# Patient Record
Sex: MALE | Race: OTHER
[De-identification: names, ages, dates, MRNs, and addresses within clinical notes are randomized per-mention and may not be internally consistent; named-entity substitution may affect disease eponyms.]

---

## 2018-10-10 ENCOUNTER — HOSPITAL ENCOUNTER (EMERGENCY)
Dept: HOSPITAL 62 - ER | Age: 33
LOS: 1 days | Discharge: HOME | End: 2018-10-11
Payer: SELF-PAY
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
## 2018-10-10 DIAGNOSIS — S68.129A: Primary | ICD-10-CM

## 2018-10-10 PROCEDURE — 99283 EMERGENCY DEPT VISIT LOW MDM: CPT

## 2018-10-10 PROCEDURE — 96365 THER/PROPH/DIAG IV INF INIT: CPT

## 2018-10-10 PROCEDURE — 96375 TX/PRO/DX INJ NEW DRUG ADDON: CPT

## 2018-10-10 PROCEDURE — 73140 X-RAY EXAM OF FINGER(S): CPT

## 2018-10-11 VITALS — DIASTOLIC BLOOD PRESSURE: 99 MMHG | SYSTOLIC BLOOD PRESSURE: 141 MMHG

## 2018-10-11 NOTE — ER DOCUMENT REPORT
ED General





- General


Chief Complaint: Finger Injury


Stated Complaint: FINGERTIP LACERATION


Time Seen by Provider: 10/10/18 23:47


Notes: 





Patient is a 33-year-old male presenting to the emergency department after 

right middle finger injury.  Patient states around 2300 hrs. this evening he 

was helping a friend move furniture when he got his right middle finger caught 

underneath a moving victoria.  States he pulled his finger out from under them 

moving ivctoria and noticed the tip of the finger was gone. Stated he collected 

the tip and brought it to the ED. 





Past medical history: None


Medications: None


Allergies: None


Patient denies any history of bleeding disorders.  Last oral intake 2200 states 

he is also had 2 beers since 2100.


TRAVEL OUTSIDE OF THE [place] IN LAST 30 DAYS: No





- Related Data


Allergies/Adverse Reactions: 


 





No Known Allergies Allergy (Unverified 10/10/18 23:55)


 











Past Medical History





- General


Information source: Patient





- Social History


Smoking Status: Unknown if Ever Smoked


Lives with: Family


Family History: Reviewed & Not Pertinent


Patient has suicidal ideation: No


Patient has homicidal ideation: No


Renal/ Medical History: Denies: Hx Peritoneal Dialysis





Review of Systems





- Review of Systems


Constitutional: No symptoms reported


EENT: No symptoms reported


Cardiovascular: No symptoms reported


Respiratory: No symptoms reported


Gastrointestinal: No symptoms reported


Genitourinary: No symptoms reported


Male Genitourinary: No symptoms reported


Musculoskeletal: See HPI


Skin: See HPI


Hematologic/Lymphatic: See HPI


Neurological/Psychological: No symptoms reported





Physical Exam





- Vital signs


Vitals: 


 











Temp Pulse Resp BP Pulse Ox


 


 98.3 F   101 H  18   149/111 H  96 


 


 10/10/18 23:29  10/10/18 23:29  10/10/18 23:29  10/10/18 23:29  10/10/18 23:29














- Notes


Notes: 





GENERAL: Alert, interacts well. No acute distress.


HEAD: Normocephalic, atraumatic.


EYES: Pupils equal, round, and reactive to light. Extraocular movements intact.


ENT: Oral mucosa moist, tongue midline. 


NECK: Full range of motion. Supple. Trachea midline.


LUNGS: Clear to auscultation bilaterally, no wheezes, rales, or rhonchi. No 

respiratory distress.


HEART: Regular rate and rhythm. No murmur


ABDOMEN: Soft, non-tender. Non-distended. Bowel sounds present in all 4 

quadrants.


EXTREMITIES: Moves all 4 extremities spontaneously. normal radial and dorsalis 

pedis pulses bilaterally. 


BACK: no cervical, thoracic, lumbar midline tenderness. No saddle anesthesia, 

normal distal neurovascular exam. 


NEUROLOGICAL: Alert and oriented x3. Normal speech. 


PSYCH: Normal affect, normal mood.


SKIN: Warm, dry, normal turgor. Middle finger right hand avulsion of tip distal 

to DIP joint. Clean cut, no skin flap.








Course





- Re-evaluation


Re-evalutation: 


Discussed case with Dr. Sellers requests dry dressing applied to distal finger.  

Antibiotic preferences Augmentin.  Follow-up in his office tomorrow morning at 

8 AM.  All of these closely discussed with patient.











- Vital Signs


Vital signs: 


 











Temp Pulse Resp BP Pulse Ox


 


 97.4 F   68   16   141/99 H  99 


 


 10/11/18 02:50  10/11/18 02:50  10/11/18 02:50  10/11/18 02:50  10/11/18 02:50














Discharge





- Discharge


Clinical Impression: 


Fingertip amputation


Qualifiers:


 Encounter type: initial encounter Qualified Code(s): S68.129A - Partial 

traumatic metacarpophalangeal amputation of unspecified finger, initial 

encounter





Condition: Stable


Disposition: HOME, SELF-CARE


Additional Instructions: 


As we discussed you are seen here in the emergency room for a partial finger 

amputation.  Please keep the dressings we have applied in place until you follow

-up with Dr. Sellers tomorrow morning at 8 AM.  His contact information is 

within this paperwork.  As we discussed he expects either tomorrow at 8 AM.  

Please take antibiotics as prescribed.  Return for any concerning symptoms.


Prescriptions: 


Amox Tr/Potassium Clavulanate [Augmentin 875-125 mg Tablet] 1 tab PO BID #[id number] 

tablet


Referrals: 


MARIA ANTONIA SELLERS MD [ACTIVE STAFF] - Follow up as needed

## 2018-10-11 NOTE — RADIOLOGY REPORT (SQ)
Right third finger three view on 10/11/2018 at 12:26 AM



CLINICAL INDICATION: Crush injury, avulsion of the third distal

finger



COMPARISON: None



FINDINGS: There has been amputation of the distal aspect of the

third finger. There is an associated open fracture of the distal

tuft of the third distal phalanx. The missing fracture fragment

and soft tissue is not present. No other fracture is noted.

Visualized joints are well aligned.



IMPRESSION: Open fracture/amputation of the distal tuft of the

third distal phalanx.

## 2018-10-15 ENCOUNTER — HOSPITAL ENCOUNTER (OUTPATIENT)
Dept: HOSPITAL 62 - OROUT | Age: 33
Discharge: HOME | End: 2018-10-15
Attending: ORTHOPAEDIC SURGERY
Payer: SELF-PAY

## 2018-10-15 VITALS — DIASTOLIC BLOOD PRESSURE: 98 MMHG | SYSTOLIC BLOOD PRESSURE: 143 MMHG

## 2018-10-15 DIAGNOSIS — S68.612A: Primary | ICD-10-CM

## 2018-10-15 DIAGNOSIS — F17.210: ICD-10-CM

## 2018-10-15 DIAGNOSIS — M86.141: ICD-10-CM
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
## 2018-10-15 LAB
ANION GAP SERPL CALC-SCNC: 8 MMOL/L (ref 5–19)
APPEARANCE UR: CLEAR
APTT PPP: (no result) S
BILIRUB UR QL STRIP: NEGATIVE
BUN SERPL-MCNC: 11 MG/DL (ref 7–20)
CALCIUM: 9.2 MG/DL (ref 8.4–10.2)
CHLORIDE SERPL-SCNC: 107 MMOL/L (ref 98–107)
CO2 SERPL-SCNC: 24 MMOL/L (ref 22–30)
ERYTHROCYTE [DISTWIDTH] IN BLOOD BY AUTOMATED COUNT: 13.7 % (ref 11.5–14)
GLUCOSE SERPL-MCNC: 105 MG/DL (ref 75–110)
GLUCOSE UR STRIP-MCNC: NEGATIVE MG/DL
HCT VFR BLD CALC: 44.5 % (ref 37.9–51)
HGB BLD-MCNC: 15 G/DL (ref 13.5–17)
KETONES UR STRIP-MCNC: (no result) MG/DL
MCH RBC QN AUTO: 32.6 PG (ref 27–33.4)
MCHC RBC AUTO-ENTMCNC: 33.8 G/DL (ref 32–36)
MCV RBC AUTO: 96 FL (ref 80–97)
NITRITE UR QL STRIP: NEGATIVE
PH UR STRIP: 6 [PH] (ref 5–9)
PLATELET # BLD: 266 10^3/UL (ref 150–450)
POTASSIUM SERPL-SCNC: 4.2 MMOL/L (ref 3.6–5)
PROT UR STRIP-MCNC: NEGATIVE MG/DL
RBC # BLD AUTO: 4.62 10^6/UL (ref 4.35–5.55)
SODIUM SERPL-SCNC: 138.6 MMOL/L (ref 137–145)
SP GR UR STRIP: 1.01
UROBILINOGEN UR-MCNC: NEGATIVE MG/DL (ref ?–2)
WBC # BLD AUTO: 7 10^3/UL (ref 4–10.5)

## 2018-10-15 PROCEDURE — 81001 URINALYSIS AUTO W/SCOPE: CPT

## 2018-10-15 PROCEDURE — 88304 TISSUE EXAM BY PATHOLOGIST: CPT

## 2018-10-15 PROCEDURE — 71045 X-RAY EXAM CHEST 1 VIEW: CPT

## 2018-10-15 PROCEDURE — 85027 COMPLETE CBC AUTOMATED: CPT

## 2018-10-15 PROCEDURE — 93010 ELECTROCARDIOGRAM REPORT: CPT

## 2018-10-15 PROCEDURE — 93005 ELECTROCARDIOGRAM TRACING: CPT

## 2018-10-15 PROCEDURE — 26951 AMPUTATION OF FINGER/THUMB: CPT

## 2018-10-15 PROCEDURE — 88311 DECALCIFY TISSUE: CPT

## 2018-10-15 PROCEDURE — 36415 COLL VENOUS BLD VENIPUNCTURE: CPT

## 2018-10-15 PROCEDURE — 80048 BASIC METABOLIC PNL TOTAL CA: CPT

## 2018-10-15 NOTE — RADIOLOGY REPORT (SQ)
EXAM DESCRIPTION:  CHEST SINGLE VIEW



COMPLETED DATE/TIME:  10/15/2018 9:15 am



REASON FOR STUDY:  PREOP



COMPARISON:  None.



EXAM PARAMETERS:  NUMBER OF VIEWS: One view.

TECHNIQUE: Single frontal radiographic view of the chest acquired.

RADIATION DOSE: NA

LIMITATIONS: None.



FINDINGS:  LUNGS AND PLEURA: No opacities, masses or pneumothorax. No pleural effusion.

MEDIASTINUM AND HILAR STRUCTURES: No masses.  Contour normal.

HEART AND VASCULAR STRUCTURES: Heart normal in size.  Normal vasculature.

BONES: No acute findings.

HARDWARE: None in the chest.

OTHER: No other significant finding.



IMPRESSION:  1. NO ACUTE RADIOGRAPHIC FINDING IN THE CHEST.



TECHNICAL DOCUMENTATION:  JOB ID:  [id number]

 2011 [id number]- All Rights Reserved



Reading location - IP/workstation name: [email or web address]

## 2018-10-15 NOTE — DISCHARGE SUMMARY
Discharge Summary (SDC)





- Discharge


Final Diagnosis: 





Right long finger distal tip amputation


Date of Surgery: 10/15/18


Discharge Date: 10/15/18


Condition: Good


Treatment or Instructions: 


Elevate right hand


Prescriptions: 


Oxycodone HCl/Acetaminophen [Percocet 5-325 mg Tablet] 1 tab PO Q6 #40 tab


Discharge Diet: As Tolerated, Regular


Respiratory Treatments at Home: Deep Breathing/Coughing


Discharge Activity: Balance Activity w/Rest, No tub bath


Home Care Assistance: None Needed


Report the Following to Your Physician Immediately: Shortness of Breath, Fever 

over 101 Degrees, Drainage-Foul Smelling

## 2018-10-15 NOTE — EKG REPORT
SEVERITY:- BORDERLINE ECG -

SINUS RHYTHM

BORDERLINE T ABNORMALITIES, INFERIOR LEADS

:

Confirmed by: Wlae Manning MD 15-Oct-2018 13:41:39

## 2018-10-15 NOTE — OPERATIVE REPORT
Operative Report


DATE OF SURGERY: 10/15/18


PREOPERATIVE DIAGNOSIS: Right long finger distal tip amputation


OPERATION: Revision right long finger amputation, DIP disarticulation


SURGEON: MARIA ANTONIA SELLERS


ANESTHESIA: LMAC


TISSUE REMOVED OR ALTERED: Fingertip to pathology


ESTIMATED BLOOD LOSS: Minimal


PROCEDURE: 





With the patient supine on the operative table digital block and is placed into 

the right long finger with quarter percent bupivacaine.  Subsequent was prepped 

and draped in a sterile fashion.  A small fishmouth incision is made over the 

distal aspect of the incision and sharp dissection was carried incision down to 

the remaining distal phalanx.  The soft tissues elevated in a DIP 

disarticulation was performed.  The nail bed is debrided.  The wound is 

irrigated.  Hemostasis obtained with bipolar cautery.  The wound was then 

closed in layers interrupted nylon suture.  A sterile compressive dressing was 

applied and the patient's return to the PACU in satisfactory condition.